# Patient Record
Sex: MALE | Race: WHITE | Employment: OTHER | URBAN - METROPOLITAN AREA
[De-identification: names, ages, dates, MRNs, and addresses within clinical notes are randomized per-mention and may not be internally consistent; named-entity substitution may affect disease eponyms.]

---

## 2018-03-21 ENCOUNTER — HOSPITAL ENCOUNTER (OUTPATIENT)
Age: 53
End: 2018-03-21
Payer: COMMERCIAL

## 2018-03-21 ENCOUNTER — HOSPITAL ENCOUNTER (OUTPATIENT)
Dept: GENERAL RADIOLOGY | Age: 53
Discharge: HOME | End: 2018-03-23
Payer: COMMERCIAL

## 2018-03-21 DIAGNOSIS — M54.5 LOW BACK PAIN, UNSPECIFIED BACK PAIN LATERALITY, UNSPECIFIED CHRONICITY, WITH SCIATICA PRESENCE UNSPECIFIED: ICD-10-CM

## 2018-03-21 PROCEDURE — 72100 X-RAY EXAM L-S SPINE 2/3 VWS: CPT

## 2018-05-07 ENCOUNTER — HOSPITAL ENCOUNTER (OUTPATIENT)
Age: 53
Discharge: HOME | End: 2018-05-09
Payer: COMMERCIAL

## 2018-05-07 LAB
ALBUMIN SERPL-MCNC: 4.8 G/DL (ref 3.5–5.2)
ALP BLD-CCNC: 37 U/L (ref 40–129)
ALT SERPL-CCNC: 52 U/L (ref 0–40)
ANION GAP SERPL CALCULATED.3IONS-SCNC: 14 MMOL/L (ref 7–16)
AST SERPL-CCNC: 41 U/L (ref 0–39)
BILIRUB SERPL-MCNC: 0.5 MG/DL (ref 0–1.2)
BUN BLDV-MCNC: 22 MG/DL (ref 6–20)
CALCIUM SERPL-MCNC: 9.4 MG/DL (ref 8.6–10.2)
CHLORIDE BLD-SCNC: 101 MMOL/L (ref 98–107)
CHOLESTEROL, TOTAL: 193 MG/DL (ref 0–199)
CO2: 21 MMOL/L (ref 22–29)
CREAT SERPL-MCNC: 1.1 MG/DL (ref 0.7–1.2)
GFR AFRICAN AMERICAN: >60
GFR NON-AFRICAN AMERICAN: >60 ML/MIN/1.73
GLUCOSE BLD-MCNC: 170 MG/DL (ref 74–109)
HCT VFR BLD CALC: 43.8 % (ref 37–54)
HDLC SERPL-MCNC: 20 MG/DL
HEMOGLOBIN: 14.8 G/DL (ref 12.5–16.5)
LDL CHOLESTEROL CALCULATED: 134 MG/DL (ref 0–99)
MCH RBC QN AUTO: 31.5 PG (ref 26–35)
MCHC RBC AUTO-ENTMCNC: 33.8 % (ref 32–34.5)
MCV RBC AUTO: 93.2 FL (ref 80–99.9)
PDW BLD-RTO: 13.2 FL (ref 11.5–15)
PLATELET # BLD: 248 E9/L (ref 130–450)
PMV BLD AUTO: 11.6 FL (ref 7–12)
POTASSIUM SERPL-SCNC: 4.6 MMOL/L (ref 3.5–5)
PROSTATE SPECIFIC ANTIGEN: 1.3 NG/ML (ref 0–4)
RBC # BLD: 4.7 E12/L (ref 3.8–5.8)
SODIUM BLD-SCNC: 136 MMOL/L (ref 132–146)
TOTAL PROTEIN: 7.5 G/DL (ref 6.4–8.3)
TRIGL SERPL-MCNC: 197 MG/DL (ref 0–149)
VLDLC SERPL CALC-MCNC: 39 MG/DL
WBC # BLD: 7.4 E9/L (ref 4.5–11.5)

## 2018-05-07 PROCEDURE — 80053 COMPREHEN METABOLIC PANEL: CPT

## 2018-05-07 PROCEDURE — 80061 LIPID PANEL: CPT

## 2018-05-07 PROCEDURE — G0103 PSA SCREENING: HCPCS

## 2018-05-07 PROCEDURE — 83036 HEMOGLOBIN GLYCOSYLATED A1C: CPT

## 2018-05-07 PROCEDURE — 85027 COMPLETE CBC AUTOMATED: CPT

## 2018-05-09 LAB — HBA1C MFR BLD: 6.3 % (ref 4.8–5.9)

## 2018-07-16 ENCOUNTER — HOSPITAL ENCOUNTER (OUTPATIENT)
Age: 53
Discharge: HOME | End: 2018-07-16
Payer: COMMERCIAL

## 2018-07-16 LAB — HBA1C MFR BLD: 6.2 % (ref 4–5.6)

## 2018-07-16 PROCEDURE — 83036 HEMOGLOBIN GLYCOSYLATED A1C: CPT

## 2018-07-16 PROCEDURE — 36415 COLL VENOUS BLD VENIPUNCTURE: CPT

## 2018-07-31 ENCOUNTER — HOSPITAL ENCOUNTER (OUTPATIENT)
Age: 53
Discharge: HOME | End: 2018-08-02
Payer: COMMERCIAL

## 2018-07-31 LAB
ALBUMIN SERPL-MCNC: 4.7 G/DL (ref 3.5–5.2)
ALP BLD-CCNC: 39 U/L (ref 40–129)
ALT SERPL-CCNC: 31 U/L (ref 0–40)
ANION GAP SERPL CALCULATED.3IONS-SCNC: 14 MMOL/L (ref 7–16)
AST SERPL-CCNC: 21 U/L (ref 0–39)
BILIRUB SERPL-MCNC: 0.3 MG/DL (ref 0–1.2)
BUN BLDV-MCNC: 19 MG/DL (ref 6–20)
CALCIUM SERPL-MCNC: 9.8 MG/DL (ref 8.6–10.2)
CHLORIDE BLD-SCNC: 104 MMOL/L (ref 98–107)
CO2: 23 MMOL/L (ref 22–29)
CREAT SERPL-MCNC: 1 MG/DL (ref 0.7–1.2)
GFR AFRICAN AMERICAN: >60
GFR NON-AFRICAN AMERICAN: >60 ML/MIN/1.73
GLUCOSE BLD-MCNC: 139 MG/DL (ref 74–109)
HCT VFR BLD CALC: 42 % (ref 37–54)
HEMOGLOBIN: 13.9 G/DL (ref 12.5–16.5)
MCH RBC QN AUTO: 30.9 PG (ref 26–35)
MCHC RBC AUTO-ENTMCNC: 33.1 % (ref 32–34.5)
MCV RBC AUTO: 93.3 FL (ref 80–99.9)
PDW BLD-RTO: 12.4 FL (ref 11.5–15)
PLATELET # BLD: 214 E9/L (ref 130–450)
PMV BLD AUTO: 11.6 FL (ref 7–12)
POTASSIUM SERPL-SCNC: 5.2 MMOL/L (ref 3.5–5)
RBC # BLD: 4.5 E12/L (ref 3.8–5.8)
SODIUM BLD-SCNC: 141 MMOL/L (ref 132–146)
TOTAL PROTEIN: 7.2 G/DL (ref 6.4–8.3)
WBC # BLD: 7.2 E9/L (ref 4.5–11.5)

## 2018-07-31 PROCEDURE — 85027 COMPLETE CBC AUTOMATED: CPT

## 2018-07-31 PROCEDURE — 80053 COMPREHEN METABOLIC PANEL: CPT

## 2018-10-30 ENCOUNTER — HOSPITAL ENCOUNTER (OUTPATIENT)
Age: 53
Discharge: HOME | End: 2018-11-01
Payer: COMMERCIAL

## 2018-10-30 LAB
ALBUMIN SERPL-MCNC: 4.8 G/DL (ref 3.5–5.2)
ALP BLD-CCNC: 47 U/L (ref 40–129)
ALT SERPL-CCNC: 33 U/L (ref 0–40)
ANION GAP SERPL CALCULATED.3IONS-SCNC: 16 MMOL/L (ref 7–16)
AST SERPL-CCNC: 16 U/L (ref 0–39)
BILIRUB SERPL-MCNC: 0.4 MG/DL (ref 0–1.2)
BUN BLDV-MCNC: 22 MG/DL (ref 6–20)
CALCIUM SERPL-MCNC: 9.4 MG/DL (ref 8.6–10.2)
CHLORIDE BLD-SCNC: 105 MMOL/L (ref 98–107)
CO2: 20 MMOL/L (ref 22–29)
CREAT SERPL-MCNC: 1 MG/DL (ref 0.7–1.2)
GFR AFRICAN AMERICAN: >60
GFR NON-AFRICAN AMERICAN: >60 ML/MIN/1.73
GLUCOSE BLD-MCNC: 145 MG/DL (ref 74–109)
HBA1C MFR BLD: 7 % (ref 4–5.6)
POTASSIUM SERPL-SCNC: 4.9 MMOL/L (ref 3.5–5)
SODIUM BLD-SCNC: 141 MMOL/L (ref 132–146)
TOTAL PROTEIN: 7.7 G/DL (ref 6.4–8.3)

## 2018-10-30 PROCEDURE — 80053 COMPREHEN METABOLIC PANEL: CPT

## 2018-10-30 PROCEDURE — 83036 HEMOGLOBIN GLYCOSYLATED A1C: CPT

## 2019-01-04 ENCOUNTER — HOSPITAL ENCOUNTER (OUTPATIENT)
Age: 54
Discharge: HOME | End: 2019-01-06
Payer: COMMERCIAL

## 2019-01-04 LAB
ALBUMIN SERPL-MCNC: 4.4 G/DL (ref 3.5–5.2)
ALP BLD-CCNC: 37 U/L (ref 40–129)
ALT SERPL-CCNC: 25 U/L (ref 0–40)
ANION GAP SERPL CALCULATED.3IONS-SCNC: 16 MMOL/L (ref 7–16)
AST SERPL-CCNC: 22 U/L (ref 0–39)
BILIRUB SERPL-MCNC: 0.7 MG/DL (ref 0–1.2)
BUN BLDV-MCNC: 21 MG/DL (ref 6–20)
CALCIUM SERPL-MCNC: 9.2 MG/DL (ref 8.6–10.2)
CHLORIDE BLD-SCNC: 99 MMOL/L (ref 98–107)
CHOLESTEROL, TOTAL: 127 MG/DL (ref 0–199)
CO2: 23 MMOL/L (ref 22–29)
CREAT SERPL-MCNC: 1.1 MG/DL (ref 0.7–1.2)
GFR AFRICAN AMERICAN: >60
GFR NON-AFRICAN AMERICAN: >60 ML/MIN/1.73
GLUCOSE BLD-MCNC: 106 MG/DL (ref 74–99)
HBA1C MFR BLD: 6.7 % (ref 4–5.6)
HCT VFR BLD CALC: 41.6 % (ref 37–54)
HDLC SERPL-MCNC: 23 MG/DL
HEMOGLOBIN: 13.9 G/DL (ref 12.5–16.5)
LDL CHOLESTEROL CALCULATED: 73 MG/DL (ref 0–99)
MCH RBC QN AUTO: 30.9 PG (ref 26–35)
MCHC RBC AUTO-ENTMCNC: 33.4 % (ref 32–34.5)
MCV RBC AUTO: 92.4 FL (ref 80–99.9)
PDW BLD-RTO: 12.5 FL (ref 11.5–15)
PLATELET # BLD: 201 E9/L (ref 130–450)
PMV BLD AUTO: 11.5 FL (ref 7–12)
POTASSIUM SERPL-SCNC: 3.9 MMOL/L (ref 3.5–5)
RBC # BLD: 4.5 E12/L (ref 3.8–5.8)
SODIUM BLD-SCNC: 138 MMOL/L (ref 132–146)
TOTAL PROTEIN: 7.2 G/DL (ref 6.4–8.3)
TRIGL SERPL-MCNC: 153 MG/DL (ref 0–149)
VLDLC SERPL CALC-MCNC: 31 MG/DL
WBC # BLD: 5.8 E9/L (ref 4.5–11.5)

## 2019-01-04 PROCEDURE — 80053 COMPREHEN METABOLIC PANEL: CPT

## 2019-01-04 PROCEDURE — 85027 COMPLETE CBC AUTOMATED: CPT

## 2019-01-04 PROCEDURE — 83036 HEMOGLOBIN GLYCOSYLATED A1C: CPT

## 2019-01-04 PROCEDURE — 80061 LIPID PANEL: CPT

## 2019-03-24 ENCOUNTER — APPOINTMENT (OUTPATIENT)
Dept: GENERAL RADIOLOGY | Age: 54
End: 2019-03-24
Payer: COMMERCIAL

## 2019-03-24 ENCOUNTER — HOSPITAL ENCOUNTER (EMERGENCY)
Age: 54
Discharge: HOME | End: 2019-03-24
Payer: COMMERCIAL

## 2019-03-24 VITALS
HEART RATE: 82 BPM | SYSTOLIC BLOOD PRESSURE: 145 MMHG | TEMPERATURE: 98 F | BODY MASS INDEX: 31.7 KG/M2 | WEIGHT: 214 LBS | RESPIRATION RATE: 18 BRPM | OXYGEN SATURATION: 98 % | DIASTOLIC BLOOD PRESSURE: 83 MMHG | HEIGHT: 69 IN

## 2019-03-24 DIAGNOSIS — S93.402A SPRAIN OF LEFT ANKLE, UNSPECIFIED LIGAMENT, INITIAL ENCOUNTER: Primary | ICD-10-CM

## 2019-03-24 PROCEDURE — 99283 EMERGENCY DEPT VISIT LOW MDM: CPT

## 2019-03-24 PROCEDURE — 73610 X-RAY EXAM OF ANKLE: CPT

## 2019-03-24 PROCEDURE — 6370000000 HC RX 637 (ALT 250 FOR IP): Performed by: NURSE PRACTITIONER

## 2019-03-24 PROCEDURE — 73630 X-RAY EXAM OF FOOT: CPT

## 2019-03-24 RX ORDER — IBUPROFEN 600 MG/1
600 TABLET ORAL ONCE
Status: COMPLETED | OUTPATIENT
Start: 2019-03-24 | End: 2019-03-24

## 2019-03-24 RX ORDER — IBUPROFEN 600 MG/1
600 TABLET ORAL 4 TIMES DAILY PRN
Qty: 40 TABLET | Refills: 0 | Status: SHIPPED | OUTPATIENT
Start: 2019-03-24 | End: 2019-04-03

## 2019-03-24 RX ADMIN — IBUPROFEN 600 MG: 600 TABLET, FILM COATED ORAL at 10:10

## 2019-03-24 ASSESSMENT — PAIN DESCRIPTION - FREQUENCY: FREQUENCY: CONTINUOUS

## 2019-03-24 ASSESSMENT — PAIN DESCRIPTION - LOCATION: LOCATION: ANKLE

## 2019-03-24 ASSESSMENT — PAIN DESCRIPTION - DESCRIPTORS: DESCRIPTORS: BURNING;CONSTANT

## 2019-03-24 ASSESSMENT — PAIN SCALES - GENERAL
PAINLEVEL_OUTOF10: 7
PAINLEVEL_OUTOF10: 5
PAINLEVEL_OUTOF10: 7

## 2019-03-24 ASSESSMENT — PAIN DESCRIPTION - ORIENTATION: ORIENTATION: LEFT

## 2019-03-24 ASSESSMENT — PAIN DESCRIPTION - PAIN TYPE: TYPE: ACUTE PAIN

## 2019-04-04 ENCOUNTER — HOSPITAL ENCOUNTER (OUTPATIENT)
Age: 54
Discharge: HOME | End: 2019-04-06
Payer: COMMERCIAL

## 2019-04-04 LAB
ALBUMIN SERPL-MCNC: 5 G/DL (ref 3.5–5.2)
ALP BLD-CCNC: 45 U/L (ref 40–129)
ALT SERPL-CCNC: 30 U/L (ref 0–40)
ANION GAP SERPL CALCULATED.3IONS-SCNC: 15 MMOL/L (ref 7–16)
AST SERPL-CCNC: 21 U/L (ref 0–39)
BILIRUB SERPL-MCNC: 0.3 MG/DL (ref 0–1.2)
BUN BLDV-MCNC: 26 MG/DL (ref 6–20)
CALCIUM SERPL-MCNC: 10.1 MG/DL (ref 8.6–10.2)
CHLORIDE BLD-SCNC: 105 MMOL/L (ref 98–107)
CHOLESTEROL, TOTAL: 164 MG/DL (ref 0–199)
CO2: 20 MMOL/L (ref 22–29)
CREAT SERPL-MCNC: 1.2 MG/DL (ref 0.7–1.2)
GFR AFRICAN AMERICAN: >60
GFR NON-AFRICAN AMERICAN: >60 ML/MIN/1.73
GLUCOSE BLD-MCNC: 198 MG/DL (ref 74–99)
HCT VFR BLD CALC: 43.1 % (ref 37–54)
HDLC SERPL-MCNC: 27 MG/DL
HEMOGLOBIN: 14 G/DL (ref 12.5–16.5)
LDL CHOLESTEROL CALCULATED: 100 MG/DL (ref 0–99)
MCH RBC QN AUTO: 30.7 PG (ref 26–35)
MCHC RBC AUTO-ENTMCNC: 32.5 % (ref 32–34.5)
MCV RBC AUTO: 94.5 FL (ref 80–99.9)
PDW BLD-RTO: 13 FL (ref 11.5–15)
PLATELET # BLD: 256 E9/L (ref 130–450)
PMV BLD AUTO: 11.3 FL (ref 7–12)
POTASSIUM SERPL-SCNC: 5.2 MMOL/L (ref 3.5–5)
RBC # BLD: 4.56 E12/L (ref 3.8–5.8)
SODIUM BLD-SCNC: 140 MMOL/L (ref 132–146)
TOTAL PROTEIN: 7.9 G/DL (ref 6.4–8.3)
TRIGL SERPL-MCNC: 187 MG/DL (ref 0–149)
TSH SERPL DL<=0.05 MIU/L-ACNC: 2.97 UIU/ML (ref 0.27–4.2)
VLDLC SERPL CALC-MCNC: 37 MG/DL
WBC # BLD: 8.3 E9/L (ref 4.5–11.5)

## 2019-04-04 PROCEDURE — 80053 COMPREHEN METABOLIC PANEL: CPT

## 2019-04-04 PROCEDURE — 85027 COMPLETE CBC AUTOMATED: CPT

## 2019-04-04 PROCEDURE — 83036 HEMOGLOBIN GLYCOSYLATED A1C: CPT

## 2019-04-04 PROCEDURE — 80061 LIPID PANEL: CPT

## 2019-04-04 PROCEDURE — 84443 ASSAY THYROID STIM HORMONE: CPT

## 2019-04-05 LAB — HBA1C MFR BLD: 7.6 % (ref 4–5.6)

## 2019-07-03 ENCOUNTER — HOSPITAL ENCOUNTER (OUTPATIENT)
Age: 54
Discharge: HOME | End: 2019-07-05
Payer: COMMERCIAL

## 2019-07-03 LAB
ALBUMIN SERPL-MCNC: 4.8 G/DL (ref 3.5–5.2)
ALP BLD-CCNC: 37 U/L (ref 40–129)
ALT SERPL-CCNC: 40 U/L (ref 0–40)
ANION GAP SERPL CALCULATED.3IONS-SCNC: 15 MMOL/L (ref 7–16)
AST SERPL-CCNC: 28 U/L (ref 0–39)
BILIRUB SERPL-MCNC: 0.3 MG/DL (ref 0–1.2)
BUN BLDV-MCNC: 19 MG/DL (ref 6–20)
CALCIUM SERPL-MCNC: 9.9 MG/DL (ref 8.6–10.2)
CHLORIDE BLD-SCNC: 106 MMOL/L (ref 98–107)
CHOLESTEROL, TOTAL: 157 MG/DL (ref 0–199)
CO2: 19 MMOL/L (ref 22–29)
CREAT SERPL-MCNC: 1 MG/DL (ref 0.7–1.2)
GFR AFRICAN AMERICAN: >60
GFR NON-AFRICAN AMERICAN: >60 ML/MIN/1.73
GLUCOSE BLD-MCNC: 181 MG/DL (ref 74–99)
HBA1C MFR BLD: 7.4 % (ref 4–5.6)
HCT VFR BLD CALC: 43.2 % (ref 37–54)
HDLC SERPL-MCNC: 32 MG/DL
HEMOGLOBIN: 14.3 G/DL (ref 12.5–16.5)
LDL CHOLESTEROL CALCULATED: 99 MG/DL (ref 0–99)
MCH RBC QN AUTO: 31.1 PG (ref 26–35)
MCHC RBC AUTO-ENTMCNC: 33.1 % (ref 32–34.5)
MCV RBC AUTO: 93.9 FL (ref 80–99.9)
PDW BLD-RTO: 12.9 FL (ref 11.5–15)
PLATELET # BLD: 222 E9/L (ref 130–450)
PMV BLD AUTO: 11.8 FL (ref 7–12)
POTASSIUM SERPL-SCNC: 4.8 MMOL/L (ref 3.5–5)
RBC # BLD: 4.6 E12/L (ref 3.8–5.8)
SODIUM BLD-SCNC: 140 MMOL/L (ref 132–146)
TOTAL PROTEIN: 8 G/DL (ref 6.4–8.3)
TRIGL SERPL-MCNC: 128 MG/DL (ref 0–149)
VLDLC SERPL CALC-MCNC: 26 MG/DL
WBC # BLD: 6.4 E9/L (ref 4.5–11.5)

## 2019-07-03 PROCEDURE — 85027 COMPLETE CBC AUTOMATED: CPT

## 2019-07-03 PROCEDURE — 83036 HEMOGLOBIN GLYCOSYLATED A1C: CPT

## 2019-07-03 PROCEDURE — 80061 LIPID PANEL: CPT

## 2019-07-03 PROCEDURE — 80053 COMPREHEN METABOLIC PANEL: CPT

## 2019-10-03 ENCOUNTER — HOSPITAL ENCOUNTER (OUTPATIENT)
Age: 54
Discharge: HOME | End: 2019-10-05
Payer: COMMERCIAL

## 2019-10-03 LAB
ALBUMIN SERPL-MCNC: 5.1 G/DL (ref 3.5–5.2)
ALP BLD-CCNC: 39 U/L (ref 40–129)
ALT SERPL-CCNC: 43 U/L (ref 0–40)
ANION GAP SERPL CALCULATED.3IONS-SCNC: 18 MMOL/L (ref 7–16)
AST SERPL-CCNC: 37 U/L (ref 0–39)
BILIRUB SERPL-MCNC: 0.3 MG/DL (ref 0–1.2)
BUN BLDV-MCNC: 24 MG/DL (ref 6–20)
CALCIUM SERPL-MCNC: 10.1 MG/DL (ref 8.6–10.2)
CHLORIDE BLD-SCNC: 105 MMOL/L (ref 98–107)
CHOLESTEROL, TOTAL: 147 MG/DL (ref 0–199)
CO2: 19 MMOL/L (ref 22–29)
CREAT SERPL-MCNC: 1.3 MG/DL (ref 0.7–1.2)
GFR AFRICAN AMERICAN: >60
GFR NON-AFRICAN AMERICAN: 58 ML/MIN/1.73
GLUCOSE BLD-MCNC: 135 MG/DL (ref 74–99)
HBA1C MFR BLD: 7 % (ref 4–5.6)
HDLC SERPL-MCNC: 25 MG/DL
LDL CHOLESTEROL CALCULATED: 94 MG/DL (ref 0–99)
POTASSIUM SERPL-SCNC: 5.2 MMOL/L (ref 3.5–5)
SODIUM BLD-SCNC: 142 MMOL/L (ref 132–146)
TOTAL PROTEIN: 8 G/DL (ref 6.4–8.3)
TRIGL SERPL-MCNC: 141 MG/DL (ref 0–149)
VLDLC SERPL CALC-MCNC: 28 MG/DL

## 2019-10-03 PROCEDURE — 80061 LIPID PANEL: CPT

## 2019-10-03 PROCEDURE — 83036 HEMOGLOBIN GLYCOSYLATED A1C: CPT

## 2019-10-03 PROCEDURE — 80053 COMPREHEN METABOLIC PANEL: CPT

## 2020-01-03 ENCOUNTER — HOSPITAL ENCOUNTER (OUTPATIENT)
Age: 55
Discharge: HOME | End: 2020-01-05
Payer: COMMERCIAL

## 2020-01-03 LAB
ALBUMIN SERPL-MCNC: 4.6 G/DL (ref 3.5–5.2)
ALP BLD-CCNC: 45 U/L (ref 40–129)
ALT SERPL-CCNC: 20 U/L (ref 0–40)
ANION GAP SERPL CALCULATED.3IONS-SCNC: 13 MMOL/L (ref 7–16)
AST SERPL-CCNC: 15 U/L (ref 0–39)
BILIRUB SERPL-MCNC: 0.2 MG/DL (ref 0–1.2)
BUN BLDV-MCNC: 22 MG/DL (ref 6–20)
CALCIUM SERPL-MCNC: 9.8 MG/DL (ref 8.6–10.2)
CHLORIDE BLD-SCNC: 107 MMOL/L (ref 98–107)
CO2: 21 MMOL/L (ref 22–29)
CREAT SERPL-MCNC: 1.2 MG/DL (ref 0.7–1.2)
GFR AFRICAN AMERICAN: >60
GFR NON-AFRICAN AMERICAN: >60 ML/MIN/1.73
GLUCOSE BLD-MCNC: 235 MG/DL (ref 74–99)
HBA1C MFR BLD: 7.1 % (ref 4–5.6)
POTASSIUM SERPL-SCNC: 4.7 MMOL/L (ref 3.5–5)
SODIUM BLD-SCNC: 141 MMOL/L (ref 132–146)
TOTAL PROTEIN: 7.3 G/DL (ref 6.4–8.3)

## 2020-01-03 PROCEDURE — 80053 COMPREHEN METABOLIC PANEL: CPT

## 2020-01-03 PROCEDURE — 83036 HEMOGLOBIN GLYCOSYLATED A1C: CPT

## 2020-04-03 ENCOUNTER — HOSPITAL ENCOUNTER (OUTPATIENT)
Age: 55
Discharge: HOME | End: 2020-04-05
Payer: COMMERCIAL

## 2020-04-03 LAB
ALBUMIN SERPL-MCNC: 4.8 G/DL (ref 3.5–5.2)
ALP BLD-CCNC: 44 U/L (ref 40–129)
ALT SERPL-CCNC: 28 U/L (ref 0–40)
ANION GAP SERPL CALCULATED.3IONS-SCNC: 15 MMOL/L (ref 7–16)
AST SERPL-CCNC: 25 U/L (ref 0–39)
BILIRUB SERPL-MCNC: 0.4 MG/DL (ref 0–1.2)
BUN BLDV-MCNC: 23 MG/DL (ref 6–20)
CALCIUM SERPL-MCNC: 10.2 MG/DL (ref 8.6–10.2)
CHLORIDE BLD-SCNC: 101 MMOL/L (ref 98–107)
CHOLESTEROL, TOTAL: 149 MG/DL (ref 0–199)
CO2: 20 MMOL/L (ref 22–29)
CREAT SERPL-MCNC: 1.2 MG/DL (ref 0.7–1.2)
GFR AFRICAN AMERICAN: >60
GFR NON-AFRICAN AMERICAN: >60 ML/MIN/1.73
GLUCOSE BLD-MCNC: 183 MG/DL (ref 74–99)
HBA1C MFR BLD: 6.9 % (ref 4–5.6)
HDLC SERPL-MCNC: 30 MG/DL
LDL CHOLESTEROL CALCULATED: 100 MG/DL (ref 0–99)
POTASSIUM SERPL-SCNC: 5.1 MMOL/L (ref 3.5–5)
SODIUM BLD-SCNC: 136 MMOL/L (ref 132–146)
TOTAL PROTEIN: 7.8 G/DL (ref 6.4–8.3)
TRIGL SERPL-MCNC: 95 MG/DL (ref 0–149)
VLDLC SERPL CALC-MCNC: 19 MG/DL

## 2020-04-03 PROCEDURE — 80061 LIPID PANEL: CPT

## 2020-04-03 PROCEDURE — 80053 COMPREHEN METABOLIC PANEL: CPT

## 2020-04-03 PROCEDURE — 83036 HEMOGLOBIN GLYCOSYLATED A1C: CPT

## 2020-05-19 ENCOUNTER — APPOINTMENT (OUTPATIENT)
Dept: GENERAL RADIOLOGY | Age: 55
End: 2020-05-19
Payer: OTHER MISCELLANEOUS

## 2020-05-19 ENCOUNTER — HOSPITAL ENCOUNTER (EMERGENCY)
Age: 55
Discharge: HOME | End: 2020-05-19
Payer: OTHER MISCELLANEOUS

## 2020-05-19 VITALS
OXYGEN SATURATION: 97 % | WEIGHT: 198 LBS | DIASTOLIC BLOOD PRESSURE: 74 MMHG | BODY MASS INDEX: 29.33 KG/M2 | HEART RATE: 86 BPM | TEMPERATURE: 97.4 F | HEIGHT: 69 IN | SYSTOLIC BLOOD PRESSURE: 131 MMHG | RESPIRATION RATE: 16 BRPM

## 2020-05-19 PROCEDURE — 73030 X-RAY EXAM OF SHOULDER: CPT

## 2020-05-19 PROCEDURE — 6360000002 HC RX W HCPCS: Performed by: PHYSICIAN ASSISTANT

## 2020-05-19 PROCEDURE — 99283 EMERGENCY DEPT VISIT LOW MDM: CPT

## 2020-05-19 PROCEDURE — 96372 THER/PROPH/DIAG INJ SC/IM: CPT

## 2020-05-19 RX ORDER — RANITIDINE 300 MG/1
300 CAPSULE ORAL EVERY EVENING
COMMUNITY

## 2020-05-19 RX ORDER — KETOROLAC TROMETHAMINE 30 MG/ML
30 INJECTION, SOLUTION INTRAMUSCULAR; INTRAVENOUS ONCE
Status: COMPLETED | OUTPATIENT
Start: 2020-05-19 | End: 2020-05-19

## 2020-05-19 RX ORDER — CANAGLIFLOZIN 300 MG/1
300 TABLET, FILM COATED ORAL
COMMUNITY

## 2020-05-19 RX ORDER — METHOCARBAMOL 500 MG/1
500 TABLET, FILM COATED ORAL 4 TIMES DAILY PRN
Qty: 30 TABLET | Refills: 0 | Status: SHIPPED | OUTPATIENT
Start: 2020-05-19 | End: 2020-05-29

## 2020-05-19 RX ORDER — DULAGLUTIDE 1.5 MG/.5ML
1.5 INJECTION, SOLUTION SUBCUTANEOUS WEEKLY
COMMUNITY

## 2020-05-19 RX ADMIN — KETOROLAC TROMETHAMINE 30 MG: 30 INJECTION, SOLUTION INTRAMUSCULAR; INTRAVENOUS at 12:31

## 2020-05-19 ASSESSMENT — PAIN DESCRIPTION - ORIENTATION: ORIENTATION: LEFT

## 2020-05-19 ASSESSMENT — PAIN DESCRIPTION - ONSET: ONSET: ON-GOING

## 2020-05-19 ASSESSMENT — PAIN - FUNCTIONAL ASSESSMENT: PAIN_FUNCTIONAL_ASSESSMENT: PREVENTS OR INTERFERES WITH MANY ACTIVE NOT PASSIVE ACTIVITIES

## 2020-05-19 ASSESSMENT — PAIN DESCRIPTION - FREQUENCY: FREQUENCY: CONTINUOUS

## 2020-05-19 ASSESSMENT — PAIN DESCRIPTION - LOCATION: LOCATION: SHOULDER

## 2020-05-19 ASSESSMENT — PAIN SCALES - GENERAL
PAINLEVEL_OUTOF10: 6
PAINLEVEL_OUTOF10: 7
PAINLEVEL_OUTOF10: 7

## 2020-05-19 ASSESSMENT — PAIN DESCRIPTION - PAIN TYPE: TYPE: ACUTE PAIN

## 2020-05-19 ASSESSMENT — PAIN DESCRIPTION - PROGRESSION: CLINICAL_PROGRESSION: GRADUALLY WORSENING

## 2020-07-03 ENCOUNTER — HOSPITAL ENCOUNTER (OUTPATIENT)
Age: 55
Discharge: HOME | End: 2020-07-05
Payer: COMMERCIAL

## 2020-07-03 LAB
ALBUMIN SERPL-MCNC: 4.5 G/DL (ref 3.5–5.2)
ALP BLD-CCNC: 41 U/L (ref 40–129)
ALT SERPL-CCNC: 27 U/L (ref 0–40)
ANION GAP SERPL CALCULATED.3IONS-SCNC: 13 MMOL/L (ref 7–16)
AST SERPL-CCNC: 25 U/L (ref 0–39)
BILIRUB SERPL-MCNC: 0.2 MG/DL (ref 0–1.2)
BUN BLDV-MCNC: 22 MG/DL (ref 6–20)
CALCIUM SERPL-MCNC: 9.5 MG/DL (ref 8.6–10.2)
CHLORIDE BLD-SCNC: 106 MMOL/L (ref 98–107)
CO2: 18 MMOL/L (ref 22–29)
CREAT SERPL-MCNC: 1.1 MG/DL (ref 0.7–1.2)
GFR AFRICAN AMERICAN: >60
GFR NON-AFRICAN AMERICAN: >60 ML/MIN/1.73
GLUCOSE BLD-MCNC: 119 MG/DL (ref 74–99)
HBA1C MFR BLD: 6.9 % (ref 4–5.6)
HCT VFR BLD CALC: 39.3 % (ref 37–54)
HEMOGLOBIN: 12.7 G/DL (ref 12.5–16.5)
MCH RBC QN AUTO: 30.7 PG (ref 26–35)
MCHC RBC AUTO-ENTMCNC: 32.3 % (ref 32–34.5)
MCV RBC AUTO: 94.9 FL (ref 80–99.9)
PDW BLD-RTO: 12.5 FL (ref 11.5–15)
PLATELET # BLD: 257 E9/L (ref 130–450)
PMV BLD AUTO: 11.8 FL (ref 7–12)
POTASSIUM SERPL-SCNC: 4.9 MMOL/L (ref 3.5–5)
RBC # BLD: 4.14 E12/L (ref 3.8–5.8)
SODIUM BLD-SCNC: 137 MMOL/L (ref 132–146)
TOTAL PROTEIN: 7.5 G/DL (ref 6.4–8.3)
WBC # BLD: 7.5 E9/L (ref 4.5–11.5)

## 2020-07-03 PROCEDURE — 83036 HEMOGLOBIN GLYCOSYLATED A1C: CPT

## 2020-07-03 PROCEDURE — 80053 COMPREHEN METABOLIC PANEL: CPT

## 2020-07-03 PROCEDURE — 85027 COMPLETE CBC AUTOMATED: CPT

## 2020-10-01 ENCOUNTER — HOSPITAL ENCOUNTER (OUTPATIENT)
Age: 55
Discharge: HOME | End: 2020-10-03
Payer: COMMERCIAL

## 2020-10-01 LAB
ALBUMIN SERPL-MCNC: 4.4 G/DL (ref 3.5–5.2)
ALP BLD-CCNC: 76 U/L (ref 40–129)
ALT SERPL-CCNC: 24 U/L (ref 0–40)
ANION GAP SERPL CALCULATED.3IONS-SCNC: 12 MMOL/L (ref 7–16)
AST SERPL-CCNC: 18 U/L (ref 0–39)
BILIRUB SERPL-MCNC: 0.3 MG/DL (ref 0–1.2)
BUN BLDV-MCNC: 26 MG/DL (ref 6–20)
CALCIUM SERPL-MCNC: 9.5 MG/DL (ref 8.6–10.2)
CHLORIDE BLD-SCNC: 102 MMOL/L (ref 98–107)
CHOLESTEROL, TOTAL: 122 MG/DL (ref 0–199)
CO2: 20 MMOL/L (ref 22–29)
CREAT SERPL-MCNC: 1 MG/DL (ref 0.7–1.2)
GFR AFRICAN AMERICAN: >60
GFR NON-AFRICAN AMERICAN: >60 ML/MIN/1.73
GLUCOSE BLD-MCNC: 198 MG/DL (ref 74–99)
HBA1C MFR BLD: 6.4 % (ref 4–5.6)
HCT VFR BLD CALC: 44 % (ref 37–54)
HDLC SERPL-MCNC: 29 MG/DL
HEMOGLOBIN: 14.6 G/DL (ref 12.5–16.5)
LDL CHOLESTEROL CALCULATED: 67 MG/DL (ref 0–99)
MCH RBC QN AUTO: 31.3 PG (ref 26–35)
MCHC RBC AUTO-ENTMCNC: 33.2 % (ref 32–34.5)
MCV RBC AUTO: 94.4 FL (ref 80–99.9)
PDW BLD-RTO: 13.3 FL (ref 11.5–15)
PLATELET # BLD: 214 E9/L (ref 130–450)
PMV BLD AUTO: 11.3 FL (ref 7–12)
POTASSIUM SERPL-SCNC: 5.3 MMOL/L (ref 3.5–5)
PROSTATE SPECIFIC ANTIGEN: 2.08 NG/ML (ref 0–4)
RBC # BLD: 4.66 E12/L (ref 3.8–5.8)
SODIUM BLD-SCNC: 134 MMOL/L (ref 132–146)
TOTAL PROTEIN: 7.3 G/DL (ref 6.4–8.3)
TRIGL SERPL-MCNC: 131 MG/DL (ref 0–149)
VLDLC SERPL CALC-MCNC: 26 MG/DL
WBC # BLD: 7.1 E9/L (ref 4.5–11.5)

## 2020-10-01 PROCEDURE — 80053 COMPREHEN METABOLIC PANEL: CPT

## 2020-10-01 PROCEDURE — G0103 PSA SCREENING: HCPCS

## 2020-10-01 PROCEDURE — 85027 COMPLETE CBC AUTOMATED: CPT

## 2020-10-01 PROCEDURE — 83036 HEMOGLOBIN GLYCOSYLATED A1C: CPT

## 2020-10-01 PROCEDURE — 80061 LIPID PANEL: CPT

## 2022-10-14 ENCOUNTER — APPOINTMENT (OUTPATIENT)
Dept: GENERAL RADIOLOGY | Age: 57
End: 2022-10-14
Payer: OTHER MISCELLANEOUS

## 2022-10-14 ENCOUNTER — HOSPITAL ENCOUNTER (EMERGENCY)
Age: 57
Discharge: HOME | End: 2022-10-14
Attending: EMERGENCY MEDICINE
Payer: OTHER MISCELLANEOUS

## 2022-10-14 VITALS
SYSTOLIC BLOOD PRESSURE: 123 MMHG | WEIGHT: 206 LBS | OXYGEN SATURATION: 95 % | DIASTOLIC BLOOD PRESSURE: 73 MMHG | BODY MASS INDEX: 30.42 KG/M2 | RESPIRATION RATE: 13 BRPM | TEMPERATURE: 98.2 F | HEART RATE: 89 BPM

## 2022-10-14 DIAGNOSIS — S93.402A SPRAIN OF LEFT ANKLE, UNSPECIFIED LIGAMENT, INITIAL ENCOUNTER: Primary | ICD-10-CM

## 2022-10-14 LAB
ABO/RH: NORMAL
ALBUMIN SERPL-MCNC: 4.2 G/DL (ref 3.5–5.2)
ALP BLD-CCNC: 115 U/L (ref 40–129)
ALT SERPL-CCNC: 25 U/L (ref 0–40)
ANION GAP SERPL CALCULATED.3IONS-SCNC: 16 MMOL/L (ref 7–16)
ANTIBODY SCREEN: NORMAL
APTT: 28.3 SEC (ref 24.5–35.1)
AST SERPL-CCNC: 18 U/L (ref 0–39)
BASOPHILS ABSOLUTE: 0.04 E9/L (ref 0–0.2)
BASOPHILS RELATIVE PERCENT: 0.5 % (ref 0–2)
BILIRUB SERPL-MCNC: 0.4 MG/DL (ref 0–1.2)
BILIRUBIN DIRECT: <0.2 MG/DL (ref 0–0.3)
BILIRUBIN, INDIRECT: NORMAL MG/DL (ref 0–1)
BUN BLDV-MCNC: 22 MG/DL (ref 6–20)
CALCIUM SERPL-MCNC: 9.5 MG/DL (ref 8.6–10.2)
CHLORIDE BLD-SCNC: 98 MMOL/L (ref 98–107)
CO2: 21 MMOL/L (ref 22–29)
CREAT SERPL-MCNC: 1.1 MG/DL (ref 0.7–1.2)
EOSINOPHILS ABSOLUTE: 0.17 E9/L (ref 0.05–0.5)
EOSINOPHILS RELATIVE PERCENT: 2.1 % (ref 0–6)
GFR AFRICAN AMERICAN: >60
GFR NON-AFRICAN AMERICAN: >60 ML/MIN/1.73
GLUCOSE BLD-MCNC: 406 MG/DL (ref 74–99)
HCT VFR BLD CALC: 45.1 % (ref 37–54)
HEMOGLOBIN: 14.9 G/DL (ref 12.5–16.5)
IMMATURE GRANULOCYTES #: 0.06 E9/L
IMMATURE GRANULOCYTES %: 0.7 % (ref 0–5)
INR BLD: 1
LYMPHOCYTES ABSOLUTE: 1.26 E9/L (ref 1.5–4)
LYMPHOCYTES RELATIVE PERCENT: 15.5 % (ref 20–42)
MCH RBC QN AUTO: 31.3 PG (ref 26–35)
MCHC RBC AUTO-ENTMCNC: 33 % (ref 32–34.5)
MCV RBC AUTO: 94.7 FL (ref 80–99.9)
MONOCYTES ABSOLUTE: 0.53 E9/L (ref 0.1–0.95)
MONOCYTES RELATIVE PERCENT: 6.5 % (ref 2–12)
NEUTROPHILS ABSOLUTE: 6.05 E9/L (ref 1.8–7.3)
NEUTROPHILS RELATIVE PERCENT: 74.7 % (ref 43–80)
PDW BLD-RTO: 12.9 FL (ref 11.5–15)
PLATELET # BLD: 178 E9/L (ref 130–450)
PMV BLD AUTO: 11 FL (ref 7–12)
POTASSIUM REFLEX MAGNESIUM: 4.3 MMOL/L (ref 3.5–5)
PROTHROMBIN TIME: 11.4 SEC (ref 9.3–12.4)
RBC # BLD: 4.76 E12/L (ref 3.8–5.8)
SODIUM BLD-SCNC: 135 MMOL/L (ref 132–146)
TOTAL PROTEIN: 7.7 G/DL (ref 6.4–8.3)
WBC # BLD: 8.1 E9/L (ref 4.5–11.5)

## 2022-10-14 PROCEDURE — 86901 BLOOD TYPING SEROLOGIC RH(D): CPT

## 2022-10-14 PROCEDURE — 85025 COMPLETE CBC W/AUTO DIFF WBC: CPT

## 2022-10-14 PROCEDURE — 73590 X-RAY EXAM OF LOWER LEG: CPT

## 2022-10-14 PROCEDURE — 73610 X-RAY EXAM OF ANKLE: CPT

## 2022-10-14 PROCEDURE — 90471 IMMUNIZATION ADMIN: CPT | Performed by: STUDENT IN AN ORGANIZED HEALTH CARE EDUCATION/TRAINING PROGRAM

## 2022-10-14 PROCEDURE — 90714 TD VACC NO PRESV 7 YRS+ IM: CPT | Performed by: STUDENT IN AN ORGANIZED HEALTH CARE EDUCATION/TRAINING PROGRAM

## 2022-10-14 PROCEDURE — 86850 RBC ANTIBODY SCREEN: CPT

## 2022-10-14 PROCEDURE — 80048 BASIC METABOLIC PNL TOTAL CA: CPT

## 2022-10-14 PROCEDURE — 73620 X-RAY EXAM OF FOOT: CPT

## 2022-10-14 PROCEDURE — 6360000002 HC RX W HCPCS: Performed by: STUDENT IN AN ORGANIZED HEALTH CARE EDUCATION/TRAINING PROGRAM

## 2022-10-14 PROCEDURE — 2580000003 HC RX 258: Performed by: STUDENT IN AN ORGANIZED HEALTH CARE EDUCATION/TRAINING PROGRAM

## 2022-10-14 PROCEDURE — 99284 EMERGENCY DEPT VISIT MOD MDM: CPT

## 2022-10-14 PROCEDURE — 86900 BLOOD TYPING SEROLOGIC ABO: CPT

## 2022-10-14 PROCEDURE — 96375 TX/PRO/DX INJ NEW DRUG ADDON: CPT

## 2022-10-14 PROCEDURE — 96374 THER/PROPH/DIAG INJ IV PUSH: CPT

## 2022-10-14 PROCEDURE — 85730 THROMBOPLASTIN TIME PARTIAL: CPT

## 2022-10-14 PROCEDURE — 80076 HEPATIC FUNCTION PANEL: CPT

## 2022-10-14 PROCEDURE — 85610 PROTHROMBIN TIME: CPT

## 2022-10-14 RX ORDER — OXYCODONE HYDROCHLORIDE AND ACETAMINOPHEN 5; 325 MG/1; MG/1
1 TABLET ORAL EVERY 6 HOURS PRN
Qty: 12 TABLET | Refills: 0 | Status: SHIPPED | OUTPATIENT
Start: 2022-10-14 | End: 2022-10-14

## 2022-10-14 RX ORDER — TETANUS AND DIPHTHERIA TOXOIDS ADSORBED 2; 2 [LF]/.5ML; [LF]/.5ML
0.5 INJECTION INTRAMUSCULAR ONCE
Status: COMPLETED | OUTPATIENT
Start: 2022-10-14 | End: 2022-10-14

## 2022-10-14 RX ORDER — OXYCODONE HYDROCHLORIDE 5 MG/1
5 TABLET ORAL EVERY 6 HOURS PRN
Qty: 12 TABLET | Refills: 0 | Status: SHIPPED | OUTPATIENT
Start: 2022-10-14 | End: 2022-10-17

## 2022-10-14 RX ORDER — MORPHINE SULFATE 4 MG/ML
4 INJECTION, SOLUTION INTRAMUSCULAR; INTRAVENOUS ONCE
Status: COMPLETED | OUTPATIENT
Start: 2022-10-14 | End: 2022-10-14

## 2022-10-14 RX ADMIN — TETANUS AND DIPHTHERIA TOXOIDS ADSORBED 0.5 ML: 2; 2 INJECTION INTRAMUSCULAR at 15:26

## 2022-10-14 RX ADMIN — MORPHINE SULFATE 4 MG: 4 INJECTION, SOLUTION INTRAMUSCULAR; INTRAVENOUS at 15:29

## 2022-10-14 RX ADMIN — WATER 2000 MG: 1 INJECTION INTRAMUSCULAR; INTRAVENOUS; SUBCUTANEOUS at 15:34

## 2022-10-14 ASSESSMENT — PAIN DESCRIPTION - ORIENTATION: ORIENTATION: LEFT

## 2022-10-14 ASSESSMENT — PAIN - FUNCTIONAL ASSESSMENT
PAIN_FUNCTIONAL_ASSESSMENT: NONE - DENIES PAIN
PAIN_FUNCTIONAL_ASSESSMENT: 0-10

## 2022-10-14 ASSESSMENT — PAIN DESCRIPTION - DESCRIPTORS: DESCRIPTORS: THROBBING

## 2022-10-14 ASSESSMENT — PAIN SCALES - GENERAL: PAINLEVEL_OUTOF10: 10

## 2022-10-14 ASSESSMENT — PAIN DESCRIPTION - LOCATION: LOCATION: LEG

## 2022-10-14 NOTE — Clinical Note
David Collado was seen and treated in our emergency department on 10/14/2022. He may return to work on 10/19/2022. If you have any questions or concerns, please don't hesitate to call.       Jerona Sacks, MD

## 2022-10-14 NOTE — Clinical Note
Ayanna Benavides was seen and treated in our emergency department on 10/14/2022. He may return to work on 10/19/2022. If you have any questions or concerns, please don't hesitate to call.       Reena Cho MD

## 2022-10-14 NOTE — Clinical Note
Carissa Null was seen and treated in our emergency department on 10/14/2022. He may return to work on 10/18/2022. If you have any questions or concerns, please don't hesitate to call.       Libby Randle MD

## 2022-10-14 NOTE — ED PROVIDER NOTES
Department of Emergency Medicine   ED  Provider Note  Admit Date/RoomTime: 10/14/2022  2:46 PM  ED Room: 10/10              10/14/22  2:48 PM EDT    History of Present Illness:   Roma Echeverria is a 56 y.o. male presenting to the ED for fall off a ladder with ankle pain. Provoking factors -patient states he fell off a ladder. Quality -severe pain to left ankle  Region-left ankle  Timing -just prior to arrival.  [facility]  Associated symptoms-patient denies any other complaints, LOC. Review of Systems:   Review of Systems   Constitutional:  Negative for activity change, appetite change, chills, diaphoresis and fever. HENT:  Negative for ear pain, sinus pain and sore throat. Eyes:  Negative for pain. Respiratory:  Negative for chest tightness and shortness of breath. Cardiovascular:  Negative for chest pain and palpitations. Gastrointestinal:  Negative for abdominal pain, constipation, diarrhea and vomiting. Genitourinary:  Negative for difficulty urinating and flank pain. Musculoskeletal:  Positive for gait problem and joint swelling. Negative for back pain and neck pain. Neurological:  Negative for dizziness, syncope, weakness, light-headedness and headaches. All other systems reviewed and are negative.            --------------------------------------------- PAST HISTORY ---------------------------------------------  Past Medical History:  has a past medical history of Asthma, Blood circulation, collateral, Depression, Gout, Hyperlipidemia, Hypertension, Neuropathy, and Type II or unspecified type diabetes mellitus without mention of complication, not stated as uncontrolled. Past Surgical History:  has no past surgical history on file. Social History:  reports that he quit smoking about 13 years ago. His smoking use included cigarettes. He has never used smokeless tobacco. He reports current alcohol use. He reports that he does not use drugs.     Family History: family history includes Cancer in his mother; Diabetes in his maternal grandmother. Unless otherwise noted, family history is non contributory    The patients home medications have been reviewed. Allergies: Patient has no known allergies.     -------------------------------------------------- RESULTS -------------------------------------------------  All laboratory and radiology results have been personally reviewed by myself   LABS:  Results for orders placed or performed during the hospital encounter of 10/14/22   CBC with Auto Differential   Result Value Ref Range    WBC 8.1 4.5 - 11.5 E9/L    RBC 4.76 3.80 - 5.80 E12/L    Hemoglobin 14.9 12.5 - 16.5 g/dL    Hematocrit 45.1 37.0 - 54.0 %    MCV 94.7 80.0 - 99.9 fL    MCH 31.3 26.0 - 35.0 pg    MCHC 33.0 32.0 - 34.5 %    RDW 12.9 11.5 - 15.0 fL    Platelets 514 481 - 591 E9/L    MPV 11.0 7.0 - 12.0 fL    Neutrophils % 74.7 43.0 - 80.0 %    Immature Granulocytes % 0.7 0.0 - 5.0 %    Lymphocytes % 15.5 (L) 20.0 - 42.0 %    Monocytes % 6.5 2.0 - 12.0 %    Eosinophils % 2.1 0.0 - 6.0 %    Basophils % 0.5 0.0 - 2.0 %    Neutrophils Absolute 6.05 1.80 - 7.30 E9/L    Immature Granulocytes # 0.06 E9/L    Lymphocytes Absolute 1.26 (L) 1.50 - 4.00 E9/L    Monocytes Absolute 0.53 0.10 - 0.95 E9/L    Eosinophils Absolute 0.17 0.05 - 0.50 E9/L    Basophils Absolute 0.04 0.00 - 0.20 J5/A   Basic Metabolic Panel w/ Reflex to MG   Result Value Ref Range    Sodium 135 132 - 146 mmol/L    Potassium reflex Magnesium 4.3 3.5 - 5.0 mmol/L    Chloride 98 98 - 107 mmol/L    CO2 21 (L) 22 - 29 mmol/L    Anion Gap 16 7 - 16 mmol/L    Glucose 406 (H) 74 - 99 mg/dL    BUN 22 (H) 6 - 20 mg/dL    Creatinine 1.1 0.7 - 1.2 mg/dL    GFR Non-African American >60 >=60 mL/min/1.73    GFR African American >60     Calcium 9.5 8.6 - 10.2 mg/dL   Hepatic Function Panel   Result Value Ref Range    Total Protein 7.7 6.4 - 8.3 g/dL    Albumin 4.2 3.5 - 5.2 g/dL    Alkaline Phosphatase 115 40 - 129 U/L    ALT 25 0 - 40 U/L    AST 18 0 - 39 U/L    Total Bilirubin 0.4 0.0 - 1.2 mg/dL    Bilirubin, Direct <0.2 0.0 - 0.3 mg/dL    Bilirubin, Indirect see below 0.0 - 1.0 mg/dL   Protime-INR   Result Value Ref Range    Protime 11.4 9.3 - 12.4 sec    INR 1.0    APTT   Result Value Ref Range    aPTT 28.3 24.5 - 35.1 sec   TYPE AND SCREEN   Result Value Ref Range    ABO/Rh AB POS     Antibody Screen NEG        RADIOLOGY:  Interpreted by Radiologist.  XR ANKLE LEFT (MIN 3 VIEWS)   Final Result   No acute osseous abnormality. Lateral malleolar soft tissue swelling and   ankle joint effusion. XR TIBIA FIBULA LEFT (2 VIEWS)   Final Result   No acute fracture identified. Questionable ankle subluxation versus   appearance related to positioning. Recommend repeat ankle lateral view in   the neutral position. XR FOOT LEFT (2 VIEWS)   Final Result   No acute fracture identified. Questionable ankle subluxation versus   appearance related to positioning. Recommend repeat ankle lateral view in   the neutral position. XR ANKLE LEFT (MIN 3 VIEWS)   Final Result   No acute fracture identified. Questionable ankle subluxation versus   appearance related to positioning. Recommend repeat ankle lateral view in   the neutral position.             ------------------------- NURSING NOTES AND VITALS REVIEWED ---------------------------   The nursing notes within the ED encounter and vital signs as below have been reviewed.    /73   Pulse 89   Temp 98.2 °F (36.8 °C)   Resp 13   Wt 206 lb (93.4 kg)   SpO2 95%   BMI 30.42 kg/m²   Oxygen Saturation Interpretation: Normal      ---------------------------------------------------PHYSICAL EXAM--------------------------------------    Constitutional/General: Alert and oriented x3, well appearing, non toxic in NAD  Head: Normocephalic and atraumatic  Eyes: PERRL, EOMI, conjunctiva normal, sclera non icteric  Mouth: Oropharynx clear, handling secretions, no trismus, no asymmetry of the posterior oropharynx or uvular edema  Neck: Supple, full ROM, non tender to palpation in the midline, no stridor, no crepitus, no meningeal signs  Respiratory: Lungs clear to auscultation bilaterally, no wheezes, rales, or rhonchi. Not in respiratory distress  Cardiovascular:  Regular rate. Regular rhythm. No murmurs, gallops, or rubs. 2+ distal pulses  Chest: No chest wall tenderness  GI:  Abdomen Soft, Non tender, Non distended. +BS. No organomegaly, no palpable masses,  No rebound, guarding, or rigidity. Musculoskeletal: Patient with obvious left ankle deformity with opening/abrasion at the lateral malleolus  Integument: skin warm and dry. No rashes. Lymphatic: no lymphadenopathy noted  Neurologic: GCS 15, no focal deficits, symmetric strength 5/5 in the upper and lower extremities bilaterally  Psychiatric: Normal Affect      ------------------------------ ED COURSE/MEDICAL DECISION MAKING----------------------  Medications   morphine injection 4 mg (4 mg IntraVENous Given 10/14/22 1529)   diptheria-tetanus toxoids (DECAVAC) 2-2 LF/0.5ML injection 0.5 mL (0.5 mLs IntraMUSCular Given 10/14/22 1526)   ceFAZolin (ANCEF) 2,000 mg in sterile water 20 mL IV syringe (2,000 mg IntraVENous Given 10/14/22 1534)         Medical Decision Making:   Patient was seen and examined with the resident. Patient has obvious deformity at the left ankle. Patient was given medications for pain including morphine. He will be sent for x-rays and Ortho will be consulted. Patient XR negative for fracture. Patient was placed in splint and he will be discharged home to /u with ortho. Counseling: The emergency provider has spoken with the patient and discussed todays results, in addition to providing specific details for the plan of care and counseling regarding the diagnosis and prognosis.   Questions are answered at this time and they are agreeable with the plan.      --------------------------------- IMPRESSION AND DISPOSITION ---------------------------------    IMPRESSION  1.  Sprain of left ankle, unspecified ligament, initial encounter        DISPOSITION  Disposition: Discharge to home  Patient condition is stable                Amando Trejo MD  10/18/22 1040

## 2022-10-18 ASSESSMENT — ENCOUNTER SYMPTOMS
EYE PAIN: 0
SHORTNESS OF BREATH: 0
SORE THROAT: 0
CHEST TIGHTNESS: 0
SINUS PAIN: 0
ABDOMINAL PAIN: 0
CONSTIPATION: 0
VOMITING: 0
DIARRHEA: 0
BACK PAIN: 0

## 2024-12-19 LAB
ERYTHROCYTE [DISTWIDTH] IN BLOOD BY AUTOMATED COUNT: 12.4 % (ref 11.5–15)
HBA1C MFR BLD: 5.5 % (ref 4–5.6)
HCT VFR BLD AUTO: 38.1 % (ref 37–54)
HGB BLD-MCNC: 12.7 G/DL (ref 12.5–16.5)
MCH RBC QN AUTO: 32.4 PG (ref 26–35)
MCHC RBC AUTO-ENTMCNC: 33.3 G/DL (ref 32–34.5)
MCV RBC AUTO: 97.2 FL (ref 80–99.9)
PLATELET # BLD AUTO: 324 K/UL (ref 130–450)
PMV BLD AUTO: 10.3 FL (ref 7–12)
RBC # BLD AUTO: 3.92 M/UL (ref 3.8–5.8)
WBC OTHER # BLD: 9.9 K/UL (ref 4.5–11.5)

## 2024-12-20 LAB
25(OH)D3 SERPL-MCNC: 68 NG/ML (ref 30–100)
ALBUMIN SERPL-MCNC: 3.9 G/DL (ref 3.5–5.2)
ALP SERPL-CCNC: 76 U/L (ref 40–129)
ALT SERPL-CCNC: 9 U/L (ref 0–40)
ANION GAP SERPL CALCULATED.3IONS-SCNC: 14 MMOL/L (ref 7–16)
AST SERPL-CCNC: 17 U/L (ref 0–39)
BILIRUB SERPL-MCNC: 0.3 MG/DL (ref 0–1.2)
BUN SERPL-MCNC: 11 MG/DL (ref 6–20)
CALCIUM SERPL-MCNC: 9.2 MG/DL (ref 8.6–10.2)
CHLORIDE SERPL-SCNC: 101 MMOL/L (ref 98–107)
CHOLEST SERPL-MCNC: 107 MG/DL
CO2 SERPL-SCNC: 25 MMOL/L (ref 22–29)
CREAT SERPL-MCNC: 1.1 MG/DL (ref 0.7–1.2)
GFR, ESTIMATED: 77 ML/MIN/1.73M2
GLUCOSE SERPL-MCNC: 81 MG/DL (ref 74–99)
HDLC SERPL-MCNC: 24 MG/DL
LDLC SERPL CALC-MCNC: 56 MG/DL
POTASSIUM SERPL-SCNC: 4.3 MMOL/L (ref 3.5–5)
PROT SERPL-MCNC: 7.7 G/DL (ref 6.4–8.3)
SODIUM SERPL-SCNC: 140 MMOL/L (ref 132–146)
TRIGL SERPL-MCNC: 134 MG/DL
TSH SERPL DL<=0.05 MIU/L-ACNC: 2.72 UIU/ML (ref 0.27–4.2)
VLDLC SERPL CALC-MCNC: 27 MG/DL

## 2025-03-19 LAB
ERYTHROCYTE [DISTWIDTH] IN BLOOD BY AUTOMATED COUNT: 13.1 % (ref 11.5–15)
HBA1C MFR BLD: 5.5 % (ref 4–5.6)
HCT VFR BLD AUTO: 41.2 % (ref 37–54)
HGB BLD-MCNC: 13.5 G/DL (ref 12.5–16.5)
MCH RBC QN AUTO: 32.7 PG (ref 26–35)
MCHC RBC AUTO-ENTMCNC: 32.8 G/DL (ref 32–34.5)
MCV RBC AUTO: 99.8 FL (ref 80–99.9)
PLATELET # BLD AUTO: 191 K/UL (ref 130–450)
PMV BLD AUTO: 10.9 FL (ref 7–12)
RBC # BLD AUTO: 4.13 M/UL (ref 3.8–5.8)
WBC OTHER # BLD: 7.3 K/UL (ref 4.5–11.5)

## 2025-03-20 LAB
25(OH)D3 SERPL-MCNC: 71.2 NG/ML (ref 30–100)
ALBUMIN SERPL-MCNC: 4.4 G/DL (ref 3.5–5.2)
ALP SERPL-CCNC: 74 U/L (ref 40–129)
ALT SERPL-CCNC: 15 U/L (ref 0–40)
ANION GAP SERPL CALCULATED.3IONS-SCNC: 15 MMOL/L (ref 7–16)
AST SERPL-CCNC: 20 U/L (ref 0–39)
BILIRUB SERPL-MCNC: 0.4 MG/DL (ref 0–1.2)
BUN SERPL-MCNC: 19 MG/DL (ref 6–20)
CALCIUM SERPL-MCNC: 9.5 MG/DL (ref 8.6–10.2)
CHLORIDE SERPL-SCNC: 104 MMOL/L (ref 98–107)
CHOLEST SERPL-MCNC: 125 MG/DL
CO2 SERPL-SCNC: 24 MMOL/L (ref 22–29)
CREAT SERPL-MCNC: 1 MG/DL (ref 0.7–1.2)
GFR, ESTIMATED: 88 ML/MIN/1.73M2
GLUCOSE SERPL-MCNC: 108 MG/DL (ref 74–99)
HDLC SERPL-MCNC: 33 MG/DL
LDLC SERPL CALC-MCNC: 71 MG/DL
POTASSIUM SERPL-SCNC: 4.3 MMOL/L (ref 3.5–5)
PROT SERPL-MCNC: 7.7 G/DL (ref 6.4–8.3)
PSA SERPL-MCNC: 3.28 NG/ML (ref 0–4)
SODIUM SERPL-SCNC: 143 MMOL/L (ref 132–146)
TRIGL SERPL-MCNC: 106 MG/DL
TSH SERPL DL<=0.05 MIU/L-ACNC: 2.79 UIU/ML (ref 0.27–4.2)
VLDLC SERPL CALC-MCNC: 21 MG/DL

## 2025-06-19 LAB
25(OH)D3 SERPL-MCNC: 60.1 NG/ML (ref 30–100)
ALBUMIN SERPL-MCNC: 4.3 G/DL (ref 3.5–5.2)
ALP SERPL-CCNC: 74 U/L (ref 40–129)
ALT SERPL-CCNC: 16 U/L (ref 0–50)
ANION GAP SERPL CALCULATED.3IONS-SCNC: 13 MMOL/L (ref 7–16)
AST SERPL-CCNC: 23 U/L (ref 0–50)
BILIRUB SERPL-MCNC: 0.5 MG/DL (ref 0–1.2)
BUN SERPL-MCNC: 16 MG/DL (ref 6–20)
CALCIUM SERPL-MCNC: 9.3 MG/DL (ref 8.6–10)
CHLORIDE SERPL-SCNC: 105 MMOL/L (ref 98–107)
CHOLEST SERPL-MCNC: 118 MG/DL
CO2 SERPL-SCNC: 25 MMOL/L (ref 22–29)
CREAT SERPL-MCNC: 1.1 MG/DL (ref 0.7–1.2)
ERYTHROCYTE [DISTWIDTH] IN BLOOD BY AUTOMATED COUNT: 13 % (ref 11.5–15)
GFR, ESTIMATED: 78 ML/MIN/1.73M2
GLUCOSE SERPL-MCNC: 81 MG/DL (ref 74–99)
HBA1C MFR BLD: 5.9 % (ref 4–5.6)
HCT VFR BLD AUTO: 41 % (ref 37–54)
HDLC SERPL-MCNC: 33 MG/DL
HGB BLD-MCNC: 13.4 G/DL (ref 12.5–16.5)
LDLC SERPL CALC-MCNC: 54 MG/DL
MCH RBC QN AUTO: 33 PG (ref 26–35)
MCHC RBC AUTO-ENTMCNC: 32.7 G/DL (ref 32–34.5)
MCV RBC AUTO: 101 FL (ref 80–99.9)
PLATELET # BLD AUTO: 198 K/UL (ref 130–450)
PMV BLD AUTO: 11.2 FL (ref 7–12)
POTASSIUM SERPL-SCNC: 4.7 MMOL/L (ref 3.5–5.1)
PROT SERPL-MCNC: 7.7 G/DL (ref 6.4–8.3)
RBC # BLD AUTO: 4.06 M/UL (ref 3.8–5.8)
SODIUM SERPL-SCNC: 143 MMOL/L (ref 136–145)
TRIGL SERPL-MCNC: 155 MG/DL
TSH SERPL DL<=0.05 MIU/L-ACNC: 4.1 UIU/ML (ref 0.27–4.2)
VLDLC SERPL CALC-MCNC: 31 MG/DL
WBC OTHER # BLD: 6.7 K/UL (ref 4.5–11.5)